# Patient Record
Sex: MALE | Race: WHITE
[De-identification: names, ages, dates, MRNs, and addresses within clinical notes are randomized per-mention and may not be internally consistent; named-entity substitution may affect disease eponyms.]

---

## 2024-01-23 ENCOUNTER — APPOINTMENT (OUTPATIENT)
Dept: MRI IMAGING | Facility: CLINIC | Age: 69
End: 2024-01-23
Payer: MEDICARE

## 2024-01-23 ENCOUNTER — APPOINTMENT (OUTPATIENT)
Dept: ORTHOPEDIC SURGERY | Facility: CLINIC | Age: 69
End: 2024-01-23
Payer: MEDICARE

## 2024-01-23 ENCOUNTER — RESULT REVIEW (OUTPATIENT)
Age: 69
End: 2024-01-23

## 2024-01-23 DIAGNOSIS — E78.00 PURE HYPERCHOLESTEROLEMIA, UNSPECIFIED: ICD-10-CM

## 2024-01-23 DIAGNOSIS — M54.16 RADICULOPATHY, LUMBAR REGION: ICD-10-CM

## 2024-01-23 DIAGNOSIS — I10 ESSENTIAL (PRIMARY) HYPERTENSION: ICD-10-CM

## 2024-01-23 PROBLEM — Z00.00 ENCOUNTER FOR PREVENTIVE HEALTH EXAMINATION: Status: ACTIVE | Noted: 2024-01-23

## 2024-01-23 PROCEDURE — 99203 OFFICE O/P NEW LOW 30 MIN: CPT

## 2024-01-23 PROCEDURE — 72148 MRI LUMBAR SPINE W/O DYE: CPT | Mod: MH

## 2024-01-23 RX ORDER — AMLODIPINE BESYLATE AND BENAZEPRIL HYDROCHLORIDE 10; 20 MG/1; MG/1
10-20 CAPSULE ORAL
Refills: 0 | Status: ACTIVE | COMMUNITY

## 2024-01-23 RX ORDER — LOSARTAN POTASSIUM 100 MG/1
100 TABLET, FILM COATED ORAL
Refills: 0 | Status: ACTIVE | COMMUNITY

## 2024-01-23 RX ORDER — METHYLPREDNISOLONE 4 MG/1
4 TABLET ORAL
Qty: 1 | Refills: 0 | Status: ACTIVE | COMMUNITY
Start: 2024-01-23 | End: 1900-01-01

## 2024-01-23 RX ORDER — BUPROPION HYDROCHLORIDE 300 MG/1
300 TABLET, EXTENDED RELEASE ORAL
Refills: 0 | Status: ACTIVE | COMMUNITY

## 2024-01-23 RX ORDER — ATORVASTATIN CALCIUM 10 MG/1
10 TABLET, FILM COATED ORAL
Refills: 0 | Status: ACTIVE | COMMUNITY

## 2024-01-23 RX ORDER — CHLORTHALIDONE 25 MG/1
25 TABLET ORAL
Refills: 0 | Status: ACTIVE | COMMUNITY

## 2024-01-23 NOTE — DISCUSSION/SUMMARY
[de-identified] : I reviewed patient's radiographs and discussed his condition and treatment options.  I advised course of steroids for pain control and inflammation.   I recommended obtaining MRI of the lumbar spine.  Follow up after MRI.  Patient voiced understanding and agreement with the plan.

## 2024-01-23 NOTE — HISTORY OF PRESENT ILLNESS
[de-identified] : Patient presents for evaluation on RT hip pain. Patient states no injury this has been ongoing for about 2-3 weeks. Patient states it started in his low back and now is radiating to his right hip and groin area. States that he is having trouble with stairs and has limited ROM. Patient has been taking Tylenol as needed.  He reports previously experiencing left sided low back pain but that subsided years ago.

## 2024-01-23 NOTE — PHYSICAL EXAM
[NL (120)] : flexion 120 degrees [5___] : flexion 5[unfilled]/5 [] : patient ambulates without assistive device [AP] : anteroposterior [Right] : right hip with pelvis [Lateral] : lateral [There are no fractures, subluxations or dislocations. No significant abnormalities are seen] : There are no fractures, subluxations or dislocations. No significant abnormalities are seen [FreeTextEntry8] : points to right sided low back and groin as area of radiating pain [FreeTextEntry9] : left lumbar osteophytes

## 2024-01-31 ENCOUNTER — APPOINTMENT (OUTPATIENT)
Dept: ORTHOPEDIC SURGERY | Facility: CLINIC | Age: 69
End: 2024-01-31
Payer: MEDICARE

## 2024-01-31 DIAGNOSIS — M48.062 SPINAL STENOSIS, LUMBAR REGION WITH NEUROGENIC CLAUDICATION: ICD-10-CM

## 2024-01-31 PROCEDURE — 99213 OFFICE O/P EST LOW 20 MIN: CPT

## 2024-01-31 NOTE — DATA REVIEWED
[MRI] : MRI [Lumbar Spine] : lumbar spine [Report was reviewed and noted in the chart] : The report was reviewed and noted in the chart [I independently reviewed and interpreted images and report] : I independently reviewed and interpreted images and report [I reviewed the films/CD and agree] : I reviewed the films/CD and agree [FreeTextEntry1] : severe central stenosis at L2/L3

## 2024-01-31 NOTE — HISTORY OF PRESENT ILLNESS
[de-identified] : Patient is following up on RT hip pain. Patient is here for MRI results, study completed at [facility] of the lumbar spine. Patient states he is feeling better since last visit due to the prednisone.

## 2024-01-31 NOTE — DISCUSSION/SUMMARY
[de-identified] : I reviewed patient's lumbar MRI and discussed his condition and treatment options.  I am referring patient to Dr. Delgadillo to assess surgical candidacy.   Patient voiced understanding and agreement with the plan.

## 2024-01-31 NOTE — PHYSICAL EXAM
[NL (120)] : flexion 120 degrees [5___] : flexion 5[unfilled]/5 [Right] : right hip with pelvis [AP] : anteroposterior [Lateral] : lateral [There are no fractures, subluxations or dislocations. No significant abnormalities are seen] : There are no fractures, subluxations or dislocations. No significant abnormalities are seen [] : no tenderness [FreeTextEntry8] : points to right sided low back and groin as area of radiating pain [FreeTextEntry9] : left lumbar osteophytes

## 2024-03-25 ENCOUNTER — APPOINTMENT (OUTPATIENT)
Dept: ORTHOPEDIC SURGERY | Facility: CLINIC | Age: 69
End: 2024-03-25
Payer: MEDICARE

## 2024-03-25 DIAGNOSIS — M51.26 OTHER INTERVERTEBRAL DISC DISPLACEMENT, LUMBAR REGION: ICD-10-CM

## 2024-03-25 DIAGNOSIS — M48.061 SPINAL STENOSIS, LUMBAR REGION WITHOUT NEUROGENIC CLAUDICATION: ICD-10-CM

## 2024-03-25 DIAGNOSIS — M51.37 OTHER INTERVERTEBRAL DISC DEGENERATION, LUMBOSACRAL REGION: ICD-10-CM

## 2024-03-25 PROCEDURE — 99214 OFFICE O/P EST MOD 30 MIN: CPT

## 2024-03-25 PROCEDURE — 99204 OFFICE O/P NEW MOD 45 MIN: CPT

## 2024-03-25 NOTE — HISTORY OF PRESENT ILLNESS
[Lower back] : lower back [Gradual] : gradual [2] : 2 [Dull/Aching] : dull/aching [0] : 0 [Intermittent] : intermittent [Injection therapy] : injection therapy [Retired] : Work status: retired [de-identified] : Patient presents today with lower back pain since September 2023 with NKI. Patient saw Dr. Mendoza for the right hip, was prescribed Medrol, and was sent for lumbar spine MRI. Patient sees Dr. Goldsmith for pain management and has had 2 ESIs with significant relief. Patient states his pain initially radiated into the right hip and groin. Patient states he has intermittent discomfort and stiffness. Patient denies taking pain medication. Patient had lumbar spine MRI at [facility]. [] : no [de-identified] : lumbar spine MRI at [facility]

## 2024-03-25 NOTE — DATA REVIEWED
[MRI] : MRI [Lumbar Spine] : lumbar spine [I independently reviewed and interpreted images and report] : I independently reviewed and interpreted images and report [FreeTextEntry1] : Large HNP L2-3 with severe stenosis, Small HNP L4-5

## 2024-03-25 NOTE — PHYSICAL EXAM
[de-identified] : Constitutional: Well groomed and developed.  Respiratory: Normal, unlabored breathing. No use of accessory muscles.  Skin: No rashes or ulcers. Skin warm and dry.  Psychiatric: Oriented to time, place, person and event. No acute distress. Gait: Heel to toe Patient able to walk on toes and heels.    Lumbar spine:  Posture: Normal on coronal and sagittal alignment  AROM:  Flexion 90  Extension 20  Moderate pain with simulated truncal motion   Tenderness:  Thoracic: No tenderness on palpation  Lumbar: Moderate tenderness on palpation  Sacrum/coccyx: no tenderness on palpation  Greater trochanteric bursa:  No tenderness  PSIS: None    Motor:                         R             L LE:                                IS                    5             5 Quad              5             5 TA                  5             5 EHL                5             5 Gastroc         5             5                 R  L DTR: Patella  2+  2+ Achilles  2+  2+  Sensory: Light touch sensation intact T12-S1  Babinski's Sign: Negative bilaterally  Straight leg raise test: Negative bilaterally  MIGUEL test: negative bilaterally

## 2024-03-25 NOTE — ASSESSMENT
[FreeTextEntry1] : Patient presents today with back pain since ~2023.  MRI 1/23/2024 shows Large HNP L2-3 with severe stenosis, and small HNP L4-5, with degeneration L3-4, 4-5. 5-S1.  He has taken Medrol Dose pack with relief and has hx of MARIANO x2 in the past.  Discussed with pt that he may be candidate for endoscopic laminectomy in future if symptoms persist. Patient with minimal pain at this point.   - Recommend NSAIDs PRN - Recommend heating pad use to decrease muscle spasm - Discussed the importance of home exercises, including but not limited to hamstring stretching and core strengthening   Patient was educated on their diagnosis today. All questions answered and patient expressed understanding.  Follow up PRN